# Patient Record
Sex: MALE | Race: WHITE | NOT HISPANIC OR LATINO | URBAN - METROPOLITAN AREA
[De-identification: names, ages, dates, MRNs, and addresses within clinical notes are randomized per-mention and may not be internally consistent; named-entity substitution may affect disease eponyms.]

---

## 2017-01-03 NOTE — PROCEDURE NOTE: CLINICAL
PROCEDURE NOTE: Lucentis 0.5 mg OS. Diagnosis: Neovascular AMD with Active CNV. Anesthesia: Subconjunctival. Prep: Betadine Flush. Prior to injection, risks/benefits/alternatives discussed including infection, loss of vision, hemorrhage, cataract, glaucoma, retinal tears or detachment and patient wished to proceed. Topical anesthesia was induced with Alcaine. Additional anesthesia was achieved using drop(s) or injection checked above. A drop of Povidone-iodine 5% ophthalmic solution was instilled over the injection site and in the inferior fornix. Betadine prep was performed. A single use vial of intravitreal Lucentis 0.5mg/0.05ml was used and excess discarded. The needle was passed 3.0 mm posterior to the limbus in pseudophakic patients, and 3.5 mm posterior to the limbus in phakic patients. The remainder of the Lucentis 0.5mg in the single-use vial was then discarded in a medical waste disposal container. The eye was irrigated with sterile irrigating solution. Patient tolerated the procedure well. There were no complications. Post procedure instructions given. Injection Time *. CF vision checked. The patient was instructed to return for re-evaluation in approximately 4-12 weeks depending on his/her condition and was told to call immediately if vision decreases and/or if his/her eye becomes red, painful, and/or light sensitive. The patient was instructed to go to the emergency room or call 911 if unable to reach the doctor within an hour or two of trying or calling. The patient was instructed to use Artificial Tears q.i.d. p.r.n for comfort. Lili Medellin

## 2017-01-03 NOTE — PATIENT DISCUSSION
POST INJECTION EVALUATION, no signs of new infection, tear, RD, VF, EOM, CNS, Vascular or other problems or side effect from previous injection(s).

## 2017-01-03 NOTE — PATIENT DISCUSSION
My findings and recommendations are based on the patient's symptoms, exam, diagnostic testing and records.

## 2017-01-03 NOTE — PATIENT DISCUSSION
Continue to MONITOR CLOSELY to determine the need for TREATMENT and INCREASE/DECREASE in length of time till next follow up visit.

## 2017-01-03 NOTE — PATIENT DISCUSSION
Reviewed risks and benefits with patient of AREDS formulation for preventing progression of AMD. Also, reviewed pros and cons of Genotype based vitamin therapy for AMD.  Patient instructed on the use of the [address] and asked to check their vision daily and to return for evaluation no later than 72 hours after any new changes. Patient instructed to avoid smoking, eat a healthy diet, exercise regularly if approved by their internist, monitor their weight and BMI and try and keep it in a normal range. For patients not on Coumadin eating a diet rich in green leafy vegtables is recommended.

## 2017-01-03 NOTE — PATIENT DISCUSSION
Observation is recommended at this time. Patient understands condition, prognosis and need for follow up care. Patient instructed to call for any new reduction in vision or distortion.

## 2017-03-21 NOTE — PROCEDURE NOTE: CLINICAL
PROCEDURE NOTE: Lucentis 0.5 mg OS. Diagnosis: Neovascular AMD with Active CNV. Anesthesia: Subconjunctival. Prep: Betadine Flush. Prior to injection, risks/benefits/alternatives discussed including infection, loss of vision, hemorrhage, cataract, glaucoma, retinal tears or detachment and patient wished to proceed. Topical anesthesia was induced with Alcaine. Additional anesthesia was achieved using drop(s) or injection checked above. A drop of Povidone-iodine 5% ophthalmic solution was instilled over the injection site and in the inferior fornix. Betadine prep was performed. A single use vial of intravitreal Lucentis 0.5mg/0.05ml was used and excess discarded. The needle was passed 3.0 mm posterior to the limbus in pseudophakic patients, and 3.5 mm posterior to the limbus in phakic patients. The remainder of the Lucentis 0.5mg in the single-use vial was then discarded in a medical waste disposal container. The eye was irrigated with sterile irrigating solution. Patient tolerated the procedure well. There were no complications. Post procedure instructions given. Injection Time *. CF vision checked. The patient was instructed to return for re-evaluation in approximately 4-12 weeks depending on his/her condition and was told to call immediately if vision decreases and/or if his/her eye becomes red, painful, and/or light sensitive. The patient was instructed to go to the emergency room or call 911 if unable to reach the doctor within an hour or two of trying or calling. The patient was instructed to use Artificial Tears q.i.d. p.r.n for comfort. Clara Jakcson

## 2017-03-21 NOTE — PATIENT DISCUSSION
Reviewed risks and benefits with patient of AREDS formulation for preventing progression of AMD. Also, reviewed pros and cons of Genotype based vitamin therapy for AMD.  Patient instructed on the use of the [address] and asked to check their vision daily and to return for evaluation no later than 72 hours after any new changes. Patient instructed to avoid smoking, eat a healthy diet, exercise regularly if approved by their internist, monitor their weight and BMI and try and keep it in a normal range. For patients not on Coumadin eating a diet rich in green leafy vegtables is recommended.

## 2017-07-24 NOTE — PATIENT DISCUSSION
Reviewed risks and benefits with patient of AREDS formulation for preventing progression of AMD. Also, reviewed pros and cons of Genotype based vitamin therapy for AMD.  Patient instructed on the use of the [address] and asked to check their vision daily and to return for evaluation no later than 72 hours after any new changes. Patient instructed to avoid smoking, eat a healthy diet, exercise regularly if approved by their internist, monitor their weight and BMI and try and keep it in a normal range. For patients not on Coumadin eating a diet rich in green leafy vegtables is recommended.

## 2017-07-24 NOTE — PROCEDURE NOTE: CLINICAL

## 2017-07-24 NOTE — PATIENT DISCUSSION
LUCENTIS AND REPEAT IN 10 WKS - TRACE EDEMA AT 4 MONTHS - GIVEN HX SRH OD RECOM RETX AND EPEAT IN 10 WKS.

## 2017-10-02 NOTE — PROCEDURE NOTE: CLINICAL
PROCEDURE NOTE: Lucentis 0.5mg PFS OS. Diagnosis: Neovascular AMD with Active CNV. Anesthesia: Topical. Prep: Betadine Flush. Prior to injection, risks/benefits/alternatives discussed including but not limited to infection, loss of vision or eye, hemorrhage, cataract, glaucoma, retinal tears or detachment. The patient wished to proceed with treatment. Topical anesthesia was induced with Alcaine. Additional anesthesia was achieved using drop(s) or injection checked above. A drop of Povidone-iodine 5% ophthalmic solution was instilled over the injection site and in the inferior fornix. Betadine prep was performed. A single use prefilled syringe of intravitreal Lucentis 0.5mg/0.05ml was used and excess discarded. The needle was passed 3.0 mm posterior to the limbus in pseudophakic patients, and 3.5 mm posterior to the limbus in phakic patients. The remainder of the Lucentis 0.5mg in the single-use vial was then discarded in a medical waste disposal container. The eye was irrigated with sterile irrigating solution. Patient tolerated the procedure well. There were no complications. Post procedure instructions given. CF vision checked. Injection Time *. The patient was instructed to return for re-evaluation in approximately 4-12 weeks depending on his/her condition and was told to call immediately if vision decreases and/or if his/her eye becomes red, painful, and/or light sensitive. The patient was instructed to go to the emergency room or call 911 if unable to reach the doctor within an hour or two of trying or calling. Dawn Everett

## 2017-10-02 NOTE — PATIENT DISCUSSION
Reviewed risks and benefits with patient of AREDS formulation for preventing progression of AMD. Also, reviewed pros and cons of Genotype based vitamin therapy for AMD.  Patient instructed on the use of the [address] and asked to check their vision daily and to return for evaluation no later than 72 hours after any new changes. Patient instructed to avoid smoking, eat a healthy diet, exercise regularly if approved by their internist, monitor their weight and BMI and try and keep it in a normal range. For patients not on Coumadin eating a diet rich in green leafy vegtables is recommended.

## 2017-12-21 NOTE — PATIENT DISCUSSION
LUCENTIS AND REPEAT IN 10 WKS - TRACE EDEMA AT 11 WEEKS - GIVEN HX SRH OD RECOM RETX AND EPEAT IN 10 WKS.

## 2017-12-21 NOTE — PROCEDURE NOTE: CLINICAL
PROCEDURE NOTE: Lucentis 0.5mg PFS OS. Diagnosis: Neovascular AMD with Active CNV. Anesthesia: Akten Gel 3.5%. Prep: Betadine Flush. Prior to injection, risks/benefits/alternatives discussed including but not limited to infection, loss of vision or eye, hemorrhage, cataract, glaucoma, retinal tears or detachment. The patient wished to proceed with treatment. Topical anesthesia was induced with Alcaine. Additional anesthesia was achieved using drop(s) or injection checked above. A drop of Povidone-iodine 5% ophthalmic solution was instilled over the injection site and in the inferior fornix. Betadine prep was performed. A single use prefilled syringe of intravitreal Lucentis 0.5mg/0.05ml was used and excess discarded. The needle was passed 3.0 mm posterior to the limbus in pseudophakic patients, and 3.5 mm posterior to the limbus in phakic patients. The remainder of the Lucentis 0.5mg in the single-use vial was then discarded in a medical waste disposal container. The eye was irrigated with sterile irrigating solution. Patient tolerated the procedure well. There were no complications. Post procedure instructions given. CF vision checked. Injection Time 12:50. The patient was instructed to return for re-evaluation in approximately 4-12 weeks depending on his/her condition and was told to call immediately if vision decreases and/or if his/her eye becomes red, painful, and/or light sensitive. The patient was instructed to go to the emergency room or call 911 if unable to reach the doctor within an hour or two of trying or calling. Cherry Bonds

## 2017-12-21 NOTE — PATIENT DISCUSSION
Reviewed risks and benefits with patient of AREDS formulation for preventing progression of AMD. Also, reviewed pros and cons of Genotype based vitamin therapy for AMD.  Patient instructed on the use of the [address] and asked to check their vision daily and to return for evaluation no later than 72 hours after any new changes. Patient instructed to avoid smoking, eat a healthy diet, exercise regularly if approved by their internist, monitor their weight and BMI and try and keep it in a normal range. For patients not on Coumadin eating a diet rich in green leafy vegtables is recommended.

## 2017-12-21 NOTE — PATIENT DISCUSSION
Cataract APPEARS VISUALLY SIGNIFICANT and patient advised to SEE DR Radha Peck for evaluation and treatment.

## 2018-03-13 NOTE — PATIENT DISCUSSION
Reviewed risks and benefits with patient of AREDS formulation for preventing progression of AMD. Also, reviewed pros and cons of Genotype based vitamin therapy for AMD.  Patient instructed on the use of the [address] and asked to check their vision daily and to return for evaluation no later than 72 hours after any new changes. Patient instructed to avoid smoking, eat a healthy diet, exercise regularly if approved by their internist, monitor their weight and BMI and try and keep it in a normal range. For patients not on Coumadin eating a diet rich in green leafy vegtables is recommended.

## 2018-03-13 NOTE — PROCEDURE NOTE: CLINICAL
PROCEDURE NOTE: Lucentis 0.5mg PFS OS. Diagnosis: Neovascular AMD with Active CNV. Anesthesia: Topical. Prep: Betadine Flush. Prior to injection, risks/benefits/alternatives discussed including but not limited to infection, loss of vision or eye, hemorrhage, cataract, glaucoma, retinal tears or detachment. The patient wished to proceed with treatment. Topical anesthesia was induced with Alcaine. Additional anesthesia was achieved using drop(s) or injection checked above. A drop of Povidone-iodine 5% ophthalmic solution was instilled over the injection site and in the inferior fornix. Betadine prep was performed. A single use prefilled syringe of intravitreal Lucentis 0.5mg/0.05ml was used and excess discarded. The needle was passed 3.0 mm posterior to the limbus in pseudophakic patients, and 3.5 mm posterior to the limbus in phakic patients. The remainder of the Lucentis 0.5mg in the single-use vial was then discarded in a medical waste disposal container. The eye was irrigated with sterile irrigating solution. Patient tolerated the procedure well. There were no complications. Post procedure instructions given. CF vision checked. Injection Time *. The patient was instructed to return for re-evaluation in approximately 4-12 weeks depending on his/her condition and was told to call immediately if vision decreases and/or if his/her eye becomes red, painful, and/or light sensitive. The patient was instructed to go to the emergency room or call 911 if unable to reach the doctor within an hour or two of trying or calling. Manny Lubin

## 2018-03-13 NOTE — PATIENT DISCUSSION
Cataract APPEARS VISUALLY SIGNIFICANT and patient advised to SEE DR Mario Lynch for evaluation and treatment.

## 2018-05-23 NOTE — PATIENT DISCUSSION
LUCENTIS AND REPEAT IN 10 WKS - TRACE EDEMA AT 10 WEEKS - GIVEN HX SRH OD RECOM RETX AND EPEAT IN 10 WKS.

## 2018-05-23 NOTE — PROCEDURE NOTE: CLINICAL
PROCEDURE NOTE: Lucentis 0.5mg PFS OS. Diagnosis: Neovascular AMD with Active CNV. Anesthesia: Topical. Prep: Betadine Flush. Prior to injection, risks/benefits/alternatives discussed including but not limited to infection, loss of vision or eye, hemorrhage, cataract, glaucoma, retinal tears or detachment. The patient wished to proceed with treatment. Topical anesthesia was induced with Alcaine. Additional anesthesia was achieved using drop(s) or injection checked above. A drop of Povidone-iodine 5% ophthalmic solution was instilled over the injection site and in the inferior fornix. Betadine prep was performed. A single use prefilled syringe of intravitreal Lucentis 0.5mg/0.05ml was used and excess discarded. The needle was passed 3.0 mm posterior to the limbus in pseudophakic patients, and 3.5 mm posterior to the limbus in phakic patients. The remainder of the Lucentis 0.5mg in the single-use vial was then discarded in a medical waste disposal container. The eye was irrigated with sterile irrigating solution. Patient tolerated the procedure well. There were no complications. Post procedure instructions given. CF vision checked. Injection Time *. The patient was instructed to return for re-evaluation in approximately 4-12 weeks depending on his/her condition and was told to call immediately if vision decreases and/or if his/her eye becomes red, painful, and/or light sensitive. The patient was instructed to go to the emergency room or call 911 if unable to reach the doctor within an hour or two of trying or calling. Essence Keenan

## 2018-05-23 NOTE — PATIENT DISCUSSION
Reviewed risks and benefits with patient of AREDS formulation for preventing progression of AMD. Also, reviewed pros and cons of Genotype based vitamin therapy for AMD.  Patient instructed on the use of the [address] and asked to check their vision daily and to return for evaluation no later than 72 hours after any new changes. Patient instructed to avoid smoking, eat a healthy diet, exercise regularly if approved by their internist, monitor their weight and BMI and try and keep it in a normal range. For patients not on Coumadin eating a diet rich in green leafy vegtables is recommended.

## 2018-05-23 NOTE — PATIENT DISCUSSION
Cataract APPEARS VISUALLY SIGNIFICANT and patient advised to SEE DR Wilian Harrison for evaluation and treatment.

## 2018-06-11 ENCOUNTER — MOHS SURGERY-IMMUNO (OUTPATIENT)
Dept: URBAN - METROPOLITAN AREA CLINIC 12 | Facility: CLINIC | Age: 73
Setting detail: DERMATOLOGY
End: 2018-06-11

## 2018-06-11 ENCOUNTER — RX ONLY (RX ONLY)
Age: 73
End: 2018-06-11

## 2018-06-11 DIAGNOSIS — L73.9 FOLLICULAR DISORDER, UNSPECIFIED: ICD-10-CM

## 2018-06-11 PROBLEM — D0361 172.6: Status: ACTIVE | Noted: 2018-06-11

## 2018-06-11 PROBLEM — D0360 172.6: Status: ACTIVE | Noted: 2018-06-11

## 2018-06-11 PROBLEM — C4360 172.6: Status: ACTIVE | Noted: 2018-06-11

## 2018-06-11 PROBLEM — D0362 172.6: Status: ACTIVE | Noted: 2018-06-11

## 2018-06-11 PROCEDURE — 88342 IMHCHEM/IMCYTCHM 1ST ANTB: CPT

## 2018-06-11 PROCEDURE — 17313 MOHS 1 STAGE T/A/L: CPT

## 2018-06-11 PROCEDURE — 14302 TIS TRNFR ADDL 30 SQ CM: CPT

## 2018-06-11 PROCEDURE — 99201 LEVEL 1 - NEW: CPT

## 2018-06-11 PROCEDURE — 11100 BX SKIN SUBCUTANEOUS&/MUCOUS MEMBRANE 1 LESION: CPT

## 2018-06-11 PROCEDURE — 14301 TIS TRNFR ANY 30.1-60 SQ CM: CPT

## 2018-06-11 RX ORDER — SULFAMETHOXAZOLE AND TRIMETHOPRIM 800; 160 MG/1; MG/1
1 TABLET TABLET ORAL BID
Qty: 14 | Refills: 0
Start: 2018-06-11

## 2018-06-25 ENCOUNTER — SUTURE REMOVAL (OUTPATIENT)
Dept: URBAN - METROPOLITAN AREA CLINIC 12 | Facility: CLINIC | Age: 73
Setting detail: DERMATOLOGY
End: 2018-06-25

## 2018-06-25 DIAGNOSIS — C44.01 BASAL CELL CARCINOMA OF SKIN OF LIP: ICD-10-CM

## 2018-06-25 PROCEDURE — 99024 POSTOP FOLLOW-UP VISIT: CPT

## 2018-06-29 NOTE — PATIENT DISCUSSION
Behavioral approaches only for sleep problems at this age. NO medications! Basically ignore awakening. Address at E scheduled next month.   Recommended observation.

## 2018-08-13 NOTE — PATIENT DISCUSSION
Reviewed risks and benefits with patient of AREDS formulation for preventing progression of AMD. Also, reviewed pros and cons of Genotype based vitamin therapy for AMD.  Patient instructed on the use of the [address] and asked to check their vision daily and to return for evaluation no later than 72 hours after any new changes. Patient instructed to avoid smoking, eat a healthy diet, exercise regularly if approved by their internist, monitor their weight and BMI and try and keep it in a normal range. For patients not on Coumadin eating a diet rich in green leafy vegtables is recommended.

## 2018-08-13 NOTE — PATIENT DISCUSSION
Cataract APPEARS VISUALLY SIGNIFICANT and patient advised to SEE DR Alicia Fox for evaluation and treatment.

## 2018-08-13 NOTE — PROCEDURE NOTE: CLINICAL

## 2018-10-22 NOTE — PATIENT DISCUSSION
Cataract APPEARS VISUALLY SIGNIFICANT and patient advised to SEE DR Emil Holman for evaluation and treatment.

## 2018-10-22 NOTE — PROCEDURE NOTE: CLINICAL
PROCEDURE NOTE: Lucentis 0.5mg PFS OS. Diagnosis: Neovascular AMD with Active CNV. Anesthesia: Topical. Prep: Betadine Flush. Prior to injection, risks/benefits/alternatives discussed including but not limited to infection, loss of vision or eye, hemorrhage, cataract, glaucoma, retinal tears or detachment. The patient wished to proceed with treatment. Topical anesthesia was induced with Alcaine. Additional anesthesia was achieved using drop(s) or injection checked above. A drop of Povidone-iodine 5% ophthalmic solution was instilled over the injection site and in the inferior fornix. Betadine prep was performed. A single use prefilled syringe of intravitreal Lucentis 0.5mg/0.05ml was used and excess discarded. The needle was passed 3.0 mm posterior to the limbus in pseudophakic patients, and 3.5 mm posterior to the limbus in phakic patients. The remainder of the Lucentis 0.5mg in the single-use vial was then discarded in a medical waste disposal container. The eye was irrigated with sterile irrigating solution. Patient tolerated the procedure well. There were no complications. Post procedure instructions given. CF vision checked. Injection Time *. The patient was instructed to return for re-evaluation in approximately 4-12 weeks depending on his/her condition and was told to call immediately if vision decreases and/or if his/her eye becomes red, painful, and/or light sensitive. The patient was instructed to go to the emergency room or call 911 if unable to reach the doctor within an hour or two of trying or calling. Vianca Garcia

## 2018-10-22 NOTE — PATIENT DISCUSSION
Reviewed risks and benefits with patient of AREDS formulation for preventing progression of AMD. Also, reviewed pros and cons of Genotype based vitamin therapy for AMD.  Patient instructed on the use of the [address] and asked to check their vision daily and to return for evaluation no later than 72 hours after any new changes. Patient instructed to avoid smoking, eat a healthy diet, exercise regularly if approved by their internist, monitor their weight and BMI and try and keep it in a normal range. For patients not on Coumadin eating a diet rich in green leafy vegtables is recommended.

## 2018-12-17 NOTE — PROCEDURE NOTE: CLINICAL
PROCEDURE NOTE: Lucentis 0.5mg PFS OS. Diagnosis: Neovascular AMD with Active CNV. Anesthesia: Topical. Prep: Betadine Flush. Prior to injection, risks/benefits/alternatives discussed including but not limited to infection, loss of vision or eye, hemorrhage, cataract, glaucoma, retinal tears or detachment. The patient wished to proceed with treatment. Topical anesthesia was induced with Alcaine. Additional anesthesia was achieved using drop(s) or injection checked above. A drop of Povidone-iodine 5% ophthalmic solution was instilled over the injection site and in the inferior fornix. Betadine prep was performed. A single use prefilled syringe of intravitreal Lucentis 0.5mg/0.05ml was used and excess discarded. The needle was passed 3.0 mm posterior to the limbus in pseudophakic patients, and 3.5 mm posterior to the limbus in phakic patients. The remainder of the Lucentis 0.5mg in the single-use vial was then discarded in a medical waste disposal container. The eye was irrigated with sterile irrigating solution. Patient tolerated the procedure well. There were no complications. Post procedure instructions given. CF vision checked. Injection Time 1:10PM. The patient was instructed to return for re-evaluation in approximately 4-12 weeks depending on his/her condition and was told to call immediately if vision decreases and/or if his/her eye becomes red, painful, and/or light sensitive. The patient was instructed to go to the emergency room or call 911 if unable to reach the doctor within an hour or two of trying or calling. Blanca Bishop

## 2018-12-17 NOTE — PATIENT DISCUSSION
Reviewed risks and benefits with patient of AREDS formulation for preventing progression of AMD. Also, reviewed pros and cons of Genotype based vitamin therapy for AMD.  Patient instructed on the use of the [address] and asked to check their vision daily and to return for evaluation no later than 72 hours after any new changes. Patient instructed to avoid smoking, eat a healthy diet, exercise regularly if approved by their internist, monitor their weight and BMI and try and keep it in a normal range. For patients not on Coumadin eating a diet rich in green leafy vegtables is recommended.

## 2018-12-17 NOTE — PATIENT DISCUSSION
Cataract APPEARS VISUALLY SIGNIFICANT and patient advised to SEE DR Monika Navarro for evaluation and treatment.

## 2019-02-25 NOTE — PROCEDURE NOTE: CLINICAL

## 2019-02-25 NOTE — PATIENT DISCUSSION
Reviewed risks and benefits with patient of AREDS formulation for preventing progression of AMD. Also, reviewed pros and cons of Genotype based vitamin therapy for AMD.  Patient instructed on the use of the [address] and asked to check their vision daily and to return for evaluation no later than 72 hours after any new changes. Patient instructed to avoid smoking, eat a healthy diet, exercise regularly if approved by their internist, monitor their weight and BMI and try and keep it in a normal range. For patients not on Coumadin eating a diet rich in green leafy vegtables is recommended.

## 2019-05-07 NOTE — PATIENT DISCUSSION
12 17 18 LUCENTIS AND REPEAT IN 10 WKS - TRACE EDEMA AT 10 WEEKS - GIVEN HX SRH OD RECOM RETX AND EPEAT IN 10 WKS.

## 2019-05-07 NOTE — PATIENT DISCUSSION
Cataract APPEARS VISUALLY SIGNIFICANT and patient advised to SEE DR Ricardo Lee for evaluation and treatment.

## 2019-05-07 NOTE — PATIENT DISCUSSION
Reviewed risks and benefits with patient of AREDS formulation for preventing progression of AMD. Also, reviewed pros and cons of Genotype based vitamin therapy for AMD.  Patient instructed on the use of the [address] and asked to check their vision daily and to return for evaluation no later than 72 hours after any new changes. Patient instructed to avoid smoking, eat a healthy diet, exercise regularly if approved by their internist, monitor their weight and BMI and try and keep it in a normal range. For patients not on Coumadin eating a diet rich in green leafy vegtables is recommended.

## 2020-07-14 ENCOUNTER — MOHS SURGERY-ROUTINE (OUTPATIENT)
Dept: URBAN - METROPOLITAN AREA CLINIC 12 | Facility: CLINIC | Age: 75
Setting detail: DERMATOLOGY
End: 2020-07-14

## 2020-07-14 DIAGNOSIS — Z08 ENCOUNTER FOR FOLLOW-UP EXAMINATION AFTER COMPLETED TREATMENT FOR MALIGNANT NEOPLASM: ICD-10-CM

## 2020-07-14 PROCEDURE — 17313 MOHS 1 STAGE T/A/L: CPT

## 2020-12-17 ENCOUNTER — IMPORTED ENCOUNTER (OUTPATIENT)
Dept: URBAN - METROPOLITAN AREA CLINIC 9 | Facility: CLINIC | Age: 75
End: 2020-12-17

## 2021-03-10 RX ORDER — DESONIDE 0.5 MG/G
1 APPLICATION CREAM TOPICAL BID
Qty: 60 | Refills: 0 | Status: DISCONTINUED
Start: 2021-03-10 | End: 2021-03-11

## 2021-03-10 RX ORDER — DOXYCYCLINE HYCLATE 20 MG/1
1 TABLET TABLET, FILM COATED ORAL BID
Qty: 60 | Refills: 1 | Status: DISCONTINUED
Start: 2021-03-10 | End: 2021-03-11

## 2021-10-16 ASSESSMENT — TONOMETRY
OS_IOP_MMHG: 16
OD_IOP_MMHG: 15

## 2021-10-16 ASSESSMENT — VISUAL ACUITY
OD_SC: 20/20 - SN
OS_SC: 20/30 - SN

## 2021-12-16 ENCOUNTER — ESTABLISHED PATIENT (OUTPATIENT)
Dept: URBAN - METROPOLITAN AREA CLINIC 5 | Facility: CLINIC | Age: 76
End: 2021-12-16

## 2021-12-16 DIAGNOSIS — H25.13: ICD-10-CM

## 2021-12-16 DIAGNOSIS — H40.1131: ICD-10-CM

## 2021-12-16 PROCEDURE — 92133 CPTRZD OPH DX IMG PST SGM ON: CPT

## 2021-12-16 PROCEDURE — 92014 COMPRE OPH EXAM EST PT 1/>: CPT

## 2021-12-16 ASSESSMENT — VISUAL ACUITY
OS_SC: 20/25
OD_SC: 20/20

## 2021-12-16 ASSESSMENT — TONOMETRY
OD_IOP_MMHG: 18
OS_IOP_MMHG: 18

## 2023-05-09 NOTE — PATIENT DISCUSSION
Continue to MONITOR CLOSELY to determine the need for TREATMENT and INCREASE/DECREASE in length of time till next follow up visit. Patient states that he has had headaches that have been intermittent for the last couple weeks.  Some will last a couple days and then go away.  He feels that his eye gets heavy and today he feels more like it is moving into his face and lower cheek with numbness.  He denies any weakness in his arms and legs.  He does not notice any changes in his smile with loss of control of his muscles in his face.  He does state that he takes Advil and it helps a little.    Due to neurological changes on one side of the face I have advised patient to follow-up in ER for rule out stroke.    Sherice Alrfedo NP on 5/9/2023 at 1:02 PM

## 2024-02-02 ENCOUNTER — ESTABLISHED PATIENT (OUTPATIENT)
Facility: LOCATION | Age: 79
End: 2024-02-02

## 2024-02-02 DIAGNOSIS — E11.9: ICD-10-CM

## 2024-02-02 DIAGNOSIS — H25.13: ICD-10-CM

## 2024-02-02 DIAGNOSIS — H40.013: ICD-10-CM

## 2024-02-02 PROCEDURE — 92014 COMPRE OPH EXAM EST PT 1/>: CPT

## 2024-02-02 PROCEDURE — 92015 DETERMINE REFRACTIVE STATE: CPT

## 2024-02-02 ASSESSMENT — KERATOMETRY
OD_AXISANGLE_DEGREES: 180
OS_AXISANGLE2_DEGREES: 138
OD_K1POWER_DIOPTERS: 44.00
OS_K2POWER_DIOPTERS: 44.25
OD_K2POWER_DIOPTERS: 44.00
OS_AXISANGLE_DEGREES: 48
OD_AXISANGLE2_DEGREES: 90
OS_K1POWER_DIOPTERS: 43.25

## 2024-02-02 ASSESSMENT — TONOMETRY
OS_IOP_MMHG: 15
OD_IOP_MMHG: 16

## 2024-02-02 ASSESSMENT — VISUAL ACUITY
OD_SC: 20/30-1
OS_PH: 20/60
OS_SC: 20/70
OU_SC: 20/40+1
OS_GLARE: 20/100-1
OD_GLARE: 20/50+2

## 2024-02-21 ENCOUNTER — TECH ONLY (OUTPATIENT)
Facility: LOCATION | Age: 79
End: 2024-02-21

## 2024-02-21 DIAGNOSIS — H40.1131: ICD-10-CM

## 2024-02-21 PROCEDURE — 92133 CPTRZD OPH DX IMG PST SGM ON: CPT

## 2024-02-21 PROCEDURE — 92083 EXTENDED VISUAL FIELD XM: CPT

## 2024-02-21 ASSESSMENT — KERATOMETRY
OD_AXISANGLE2_DEGREES: 90
OD_K2POWER_DIOPTERS: 44.00
OS_K2POWER_DIOPTERS: 44.25
OS_K1POWER_DIOPTERS: 43.25
OS_AXISANGLE2_DEGREES: 138
OD_AXISANGLE_DEGREES: 180
OS_AXISANGLE_DEGREES: 48
OD_K1POWER_DIOPTERS: 44.00

## 2024-03-08 ENCOUNTER — PRE-OP/H&P (OUTPATIENT)
Facility: LOCATION | Age: 79
End: 2024-03-08

## 2024-03-08 DIAGNOSIS — H25.13: ICD-10-CM

## 2024-03-08 PROCEDURE — 92136 OPHTHALMIC BIOMETRY: CPT

## 2024-03-08 ASSESSMENT — KERATOMETRY
OD_AXISANGLE2_DEGREES: 90
OS_K2POWER_DIOPTERS: 44.25
OD_AXISANGLE_DEGREES: 180
OS_AXISANGLE_DEGREES: 48
OD_K2POWER_DIOPTERS: 44.00
OD_K1POWER_DIOPTERS: 44.00
OS_K1POWER_DIOPTERS: 43.25
OS_AXISANGLE2_DEGREES: 138

## 2024-03-08 ASSESSMENT — VISUAL ACUITY
OS_GLARE: 20/100
OS_SC: 20/70
OD_GLARE: 20/60
OD_SC: 20/30

## 2024-04-16 ENCOUNTER — ESTABLISHED PATIENT (OUTPATIENT)
Facility: LOCATION | Age: 79
End: 2024-04-16

## 2024-04-16 DIAGNOSIS — H40.1131: ICD-10-CM

## 2024-04-16 DIAGNOSIS — H25.13: ICD-10-CM

## 2024-04-16 PROCEDURE — 92014 COMPRE OPH EXAM EST PT 1/>: CPT

## 2024-04-16 PROCEDURE — 92136 OPHTHALMIC BIOMETRY: CPT

## 2024-04-16 ASSESSMENT — KERATOMETRY
OS_K2POWER_DIOPTERS: 44.50
OD_K1POWER_DIOPTERS: 43.50
OD_AXISANGLE2_DEGREES: 165
OS_AXISANGLE_DEGREES: 47
OS_AXISANGLE2_DEGREES: 137
OD_AXISANGLE_DEGREES: 75
OS_K1POWER_DIOPTERS: 43.75
OD_K2POWER_DIOPTERS: 44.25

## 2024-04-16 ASSESSMENT — TONOMETRY
OD_IOP_MMHG: 14
OS_IOP_MMHG: 14

## 2024-04-16 ASSESSMENT — VISUAL ACUITY
OU_SC: 20/20-1
OS_SC: 20/40-2
OD_GLARE: 20/400
OD_SC: 20/20-2

## 2024-05-09 ENCOUNTER — POST-OP (OUTPATIENT)
Dept: URBAN - METROPOLITAN AREA CLINIC 18 | Facility: CLINIC | Age: 79
End: 2024-05-09

## 2024-05-09 DIAGNOSIS — Z96.1: ICD-10-CM

## 2024-05-09 PROCEDURE — P6698455 NON-COMANAGED ADVANCED PO

## 2024-05-09 ASSESSMENT — TONOMETRY: OS_IOP_MMHG: 21

## 2024-05-09 ASSESSMENT — KERATOMETRY
OD_AXISANGLE_DEGREES: 75
OS_AXISANGLE2_DEGREES: 137
OD_AXISANGLE2_DEGREES: 165
OS_K2POWER_DIOPTERS: 44.50
OS_K1POWER_DIOPTERS: 43.75
OD_K1POWER_DIOPTERS: 43.50
OD_K2POWER_DIOPTERS: 44.25
OS_AXISANGLE_DEGREES: 47

## 2024-05-09 ASSESSMENT — VISUAL ACUITY: OS_SC: 20/20-2

## 2024-05-14 ENCOUNTER — POST-OP (OUTPATIENT)
Facility: LOCATION | Age: 79
End: 2024-05-14

## 2024-05-14 DIAGNOSIS — H25.11: ICD-10-CM

## 2024-05-14 DIAGNOSIS — Z96.1: ICD-10-CM

## 2024-05-14 PROCEDURE — 99024 POSTOP FOLLOW-UP VISIT: CPT

## 2024-05-14 PROCEDURE — 92136 OPHTHALMIC BIOMETRY: CPT | Mod: 26

## 2024-05-14 ASSESSMENT — KERATOMETRY
OS_AXISANGLE_DEGREES: 46
OS_K1POWER_DIOPTERS: 43.50
OS_AXISANGLE2_DEGREES: 136
OS_K2POWER_DIOPTERS: 44.50

## 2024-05-14 ASSESSMENT — VISUAL ACUITY
OS_SC: J1
OS_SC: 20/25

## 2024-05-14 ASSESSMENT — TONOMETRY: OS_IOP_MMHG: 16

## 2024-05-29 PROBLEM — Z96.1: Noted: 2024-05-29

## 2024-05-29 ASSESSMENT — KERATOMETRY
OS_AXISANGLE2_DEGREES: 136
OS_K1POWER_DIOPTERS: 43.50
OS_K2POWER_DIOPTERS: 44.50
OS_AXISANGLE_DEGREES: 46

## 2024-05-30 ENCOUNTER — POST-OP (OUTPATIENT)
Dept: URBAN - METROPOLITAN AREA CLINIC 18 | Facility: CLINIC | Age: 79
End: 2024-05-30

## 2024-05-30 DIAGNOSIS — Z96.1: ICD-10-CM

## 2024-05-30 ASSESSMENT — VISUAL ACUITY
OD_SC: 20/25
OD_SC: J1

## 2024-05-30 ASSESSMENT — TONOMETRY: OD_IOP_MMHG: 20

## 2024-06-11 ENCOUNTER — POST-OP (OUTPATIENT)
Facility: LOCATION | Age: 79
End: 2024-06-11

## 2024-06-11 DIAGNOSIS — Z96.1: ICD-10-CM

## 2024-06-11 PROCEDURE — 99024 POSTOP FOLLOW-UP VISIT: CPT

## 2024-06-11 ASSESSMENT — VISUAL ACUITY
OS_SC: J2-1
OS_SC: 20/20
OD_SC: 20/25-1
OD_SC: J1-1
OU_SC: 20/20

## 2024-06-11 ASSESSMENT — KERATOMETRY
OD_K1POWER_DIOPTERS: 43.75
OS_AXISANGLE_DEGREES: 45
OS_AXISANGLE2_DEGREES: 135
OS_K1POWER_DIOPTERS: 43.50
OD_AXISANGLE2_DEGREES: 149
OS_K2POWER_DIOPTERS: 44.25
OD_AXISANGLE_DEGREES: 59
OD_K2POWER_DIOPTERS: 44.25

## 2024-06-11 ASSESSMENT — TONOMETRY
OS_IOP_MMHG: 10
OD_IOP_MMHG: 12

## 2024-12-10 ENCOUNTER — COMPREHENSIVE EXAM (OUTPATIENT)
Age: 79
End: 2024-12-10

## 2024-12-10 DIAGNOSIS — E11.9: ICD-10-CM

## 2024-12-10 DIAGNOSIS — H40.1131: ICD-10-CM

## 2024-12-10 DIAGNOSIS — H26.493: ICD-10-CM

## 2024-12-10 PROCEDURE — 92133 CPTRZD OPH DX IMG PST SGM ON: CPT

## 2024-12-10 PROCEDURE — 92134 CPTRZ OPH DX IMG PST SGM RTA: CPT | Mod: NC

## 2024-12-10 PROCEDURE — 92014 COMPRE OPH EXAM EST PT 1/>: CPT

## 2024-12-10 PROCEDURE — 92015 DETERMINE REFRACTIVE STATE: CPT | Mod: NC

## 2025-06-10 ENCOUNTER — FOLLOW UP (OUTPATIENT)
Age: 80
End: 2025-06-10

## 2025-06-10 DIAGNOSIS — H40.1131: ICD-10-CM

## 2025-06-10 PROCEDURE — 92012 INTRM OPH EXAM EST PATIENT: CPT

## 2025-06-10 PROCEDURE — 92083 EXTENDED VISUAL FIELD XM: CPT
